# Patient Record
Sex: FEMALE | Race: BLACK OR AFRICAN AMERICAN | NOT HISPANIC OR LATINO | URBAN - METROPOLITAN AREA
[De-identification: names, ages, dates, MRNs, and addresses within clinical notes are randomized per-mention and may not be internally consistent; named-entity substitution may affect disease eponyms.]

---

## 2018-08-03 ENCOUNTER — EMERGENCY (EMERGENCY)
Facility: HOSPITAL | Age: 33
LOS: 1 days | Discharge: HOME | End: 2018-08-03
Admitting: EMERGENCY MEDICINE
Payer: MEDICAID

## 2018-08-03 VITALS
TEMPERATURE: 98 F | OXYGEN SATURATION: 97 % | DIASTOLIC BLOOD PRESSURE: 67 MMHG | SYSTOLIC BLOOD PRESSURE: 130 MMHG | RESPIRATION RATE: 16 BRPM | HEART RATE: 89 BPM

## 2018-08-03 DIAGNOSIS — B00.9 HERPESVIRAL INFECTION, UNSPECIFIED: ICD-10-CM

## 2018-08-03 PROCEDURE — 99282 EMERGENCY DEPT VISIT SF MDM: CPT

## 2018-08-03 RX ORDER — VALACYCLOVIR 500 MG/1
1 TABLET, FILM COATED ORAL
Qty: 20 | Refills: 0 | OUTPATIENT
Start: 2018-08-03 | End: 2018-08-12

## 2018-08-03 RX ORDER — ACETAMINOPHEN 500 MG
650 TABLET ORAL ONCE
Qty: 0 | Refills: 0 | Status: DISCONTINUED | OUTPATIENT
Start: 2018-08-03 | End: 2018-08-07

## 2018-08-03 NOTE — ED PROVIDER NOTE - MEDICAL DECISION MAKING DETAILS
patient was told by her PMD she was positive for herpes I and II on routine blood work, no outbreak now, patient asking for rx for valtrex if she has outbreak, she will follow up with pmd

## 2018-08-03 NOTE — ED PROVIDER NOTE - OBJECTIVE STATEMENT
32 yo female here with her children stating that she was told by her PMD that she was positive for herpes I and II on routine testing and she is asking for medications to treat this condition. She has no signs or symptoms of outbreaks - she denies rashes or pain to oral or genital area.

## 2018-08-03 NOTE — ED ADULT TRIAGE NOTE - CHIEF COMPLAINT QUOTE
patient walk in "I was diagnoses with Herpes 1 and 2 and my doctor didn't do anything for me. I think I need medicine." no fever, s/s of outbreak

## 2018-08-03 NOTE — ED PROVIDER NOTE - ENMT, MLM
Airway patent, Nasal mucosa clear. Mouth with normal mucosa. Throat has no vesicles, no oropharyngeal exudates and uvula is midline.  no oral or pharyngeal rashes or vesicles

## 2018-08-03 NOTE — ED ADULT NURSE NOTE - NSIMPLEMENTINTERV_GEN_ALL_ED
Implemented All Universal Safety Interventions:  [place] to call system. Call bell, personal items and telephone within reach. Instruct patient to call for assistance. Room bathroom lighting operational. Non-slip footwear when patient is off stretcher. Physically safe environment: no spills, clutter or unnecessary equipment. Stretcher in lowest position, wheels locked, appropriate side rails in place.

## 2021-07-14 ENCOUNTER — EMERGENCY (EMERGENCY)
Facility: HOSPITAL | Age: 36
LOS: 1 days | Discharge: HOME | End: 2021-07-14
Attending: EMERGENCY MEDICINE | Admitting: EMERGENCY MEDICINE
Payer: MEDICAID

## 2021-07-14 ENCOUNTER — EMERGENCY (EMERGENCY)
Facility: HOSPITAL | Age: 36
LOS: 1 days | Discharge: ANOTHER HOSPITAL | End: 2021-07-14
Attending: EMERGENCY MEDICINE | Admitting: EMERGENCY MEDICINE
Payer: MEDICAID

## 2021-07-14 VITALS
OXYGEN SATURATION: 97 % | RESPIRATION RATE: 16 BRPM | TEMPERATURE: 98 F | SYSTOLIC BLOOD PRESSURE: 142 MMHG | WEIGHT: 130.07 LBS | DIASTOLIC BLOOD PRESSURE: 78 MMHG | HEIGHT: 65 IN | HEART RATE: 76 BPM

## 2021-07-14 VITALS
HEART RATE: 79 BPM | DIASTOLIC BLOOD PRESSURE: 79 MMHG | SYSTOLIC BLOOD PRESSURE: 109 MMHG | RESPIRATION RATE: 16 BRPM | OXYGEN SATURATION: 98 %

## 2021-07-14 VITALS
SYSTOLIC BLOOD PRESSURE: 136 MMHG | RESPIRATION RATE: 16 BRPM | OXYGEN SATURATION: 97 % | WEIGHT: 149.91 LBS | TEMPERATURE: 99 F | DIASTOLIC BLOOD PRESSURE: 75 MMHG | HEART RATE: 90 BPM | HEIGHT: 65 IN

## 2021-07-14 DIAGNOSIS — O26.891 OTHER SPECIFIED PREGNANCY RELATED CONDITIONS, FIRST TRIMESTER: ICD-10-CM

## 2021-07-14 DIAGNOSIS — R10.2 PELVIC AND PERINEAL PAIN: ICD-10-CM

## 2021-07-14 DIAGNOSIS — Z3A.11 11 WEEKS GESTATION OF PREGNANCY: ICD-10-CM

## 2021-07-14 DIAGNOSIS — Z3A.08 8 WEEKS GESTATION OF PREGNANCY: ICD-10-CM

## 2021-07-14 DIAGNOSIS — Z20.822 CONTACT WITH AND (SUSPECTED) EXPOSURE TO COVID-19: ICD-10-CM

## 2021-07-14 LAB
ALBUMIN SERPL ELPH-MCNC: 3.3 G/DL — LOW (ref 3.4–5)
ALP SERPL-CCNC: 43 U/L — SIGNIFICANT CHANGE UP (ref 40–120)
ALT FLD-CCNC: 18 U/L — SIGNIFICANT CHANGE UP (ref 12–42)
ANION GAP SERPL CALC-SCNC: 8 MMOL/L — LOW (ref 9–16)
APPEARANCE UR: CLEAR — SIGNIFICANT CHANGE UP
AST SERPL-CCNC: 12 U/L — LOW (ref 15–37)
BASOPHILS # BLD AUTO: 0.01 K/UL — SIGNIFICANT CHANGE UP (ref 0–0.2)
BASOPHILS NFR BLD AUTO: 0.2 % — SIGNIFICANT CHANGE UP (ref 0–2)
BILIRUB SERPL-MCNC: 0.4 MG/DL — SIGNIFICANT CHANGE UP (ref 0.2–1.2)
BILIRUB UR-MCNC: NEGATIVE — SIGNIFICANT CHANGE UP
BUN SERPL-MCNC: 10 MG/DL — SIGNIFICANT CHANGE UP (ref 7–23)
CALCIUM SERPL-MCNC: 9 MG/DL — SIGNIFICANT CHANGE UP (ref 8.5–10.5)
CHLORIDE SERPL-SCNC: 104 MMOL/L — SIGNIFICANT CHANGE UP (ref 96–108)
CO2 SERPL-SCNC: 24 MMOL/L — SIGNIFICANT CHANGE UP (ref 22–31)
COLOR SPEC: YELLOW — SIGNIFICANT CHANGE UP
CREAT SERPL-MCNC: 0.66 MG/DL — SIGNIFICANT CHANGE UP (ref 0.5–1.3)
DIFF PNL FLD: NEGATIVE — SIGNIFICANT CHANGE UP
EOSINOPHIL # BLD AUTO: 0.04 K/UL — SIGNIFICANT CHANGE UP (ref 0–0.5)
EOSINOPHIL NFR BLD AUTO: 0.7 % — SIGNIFICANT CHANGE UP (ref 0–6)
GLUCOSE SERPL-MCNC: 122 MG/DL — HIGH (ref 70–99)
GLUCOSE UR QL: NEGATIVE — SIGNIFICANT CHANGE UP
HCG SERPL-ACNC: HIGH MIU/ML
HCT VFR BLD CALC: 31.6 % — LOW (ref 34.5–45)
HGB BLD-MCNC: 10.8 G/DL — LOW (ref 11.5–15.5)
IMM GRANULOCYTES NFR BLD AUTO: 0.3 % — SIGNIFICANT CHANGE UP (ref 0–1.5)
KETONES UR-MCNC: NEGATIVE — SIGNIFICANT CHANGE UP
LEUKOCYTE ESTERASE UR-ACNC: NEGATIVE — SIGNIFICANT CHANGE UP
LYMPHOCYTES # BLD AUTO: 1.96 K/UL — SIGNIFICANT CHANGE UP (ref 1–3.3)
LYMPHOCYTES # BLD AUTO: 33.8 % — SIGNIFICANT CHANGE UP (ref 13–44)
MCHC RBC-ENTMCNC: 29.3 PG — SIGNIFICANT CHANGE UP (ref 27–34)
MCHC RBC-ENTMCNC: 34.2 GM/DL — SIGNIFICANT CHANGE UP (ref 32–36)
MCV RBC AUTO: 85.9 FL — SIGNIFICANT CHANGE UP (ref 80–100)
MONOCYTES # BLD AUTO: 0.41 K/UL — SIGNIFICANT CHANGE UP (ref 0–0.9)
MONOCYTES NFR BLD AUTO: 7.1 % — SIGNIFICANT CHANGE UP (ref 2–14)
NEUTROPHILS # BLD AUTO: 3.36 K/UL — SIGNIFICANT CHANGE UP (ref 1.8–7.4)
NEUTROPHILS NFR BLD AUTO: 57.9 % — SIGNIFICANT CHANGE UP (ref 43–77)
NITRITE UR-MCNC: NEGATIVE — SIGNIFICANT CHANGE UP
NRBC # BLD: 0 /100 WBCS — SIGNIFICANT CHANGE UP (ref 0–0)
PH UR: 6 — SIGNIFICANT CHANGE UP (ref 5–8)
PLATELET # BLD AUTO: 288 K/UL — SIGNIFICANT CHANGE UP (ref 150–400)
POTASSIUM SERPL-MCNC: 3.5 MMOL/L — SIGNIFICANT CHANGE UP (ref 3.5–5.3)
POTASSIUM SERPL-SCNC: 3.5 MMOL/L — SIGNIFICANT CHANGE UP (ref 3.5–5.3)
PROT SERPL-MCNC: 7.4 G/DL — SIGNIFICANT CHANGE UP (ref 6.4–8.2)
PROT UR-MCNC: NEGATIVE MG/DL — SIGNIFICANT CHANGE UP
RBC # BLD: 3.68 M/UL — LOW (ref 3.8–5.2)
RBC # FLD: 11.8 % — SIGNIFICANT CHANGE UP (ref 10.3–14.5)
SODIUM SERPL-SCNC: 136 MMOL/L — SIGNIFICANT CHANGE UP (ref 132–145)
SP GR SPEC: >=1.03 — SIGNIFICANT CHANGE UP (ref 1–1.03)
UROBILINOGEN FLD QL: 0.2 E.U./DL — SIGNIFICANT CHANGE UP
WBC # BLD: 5.8 K/UL — SIGNIFICANT CHANGE UP (ref 3.8–10.5)
WBC # FLD AUTO: 5.8 K/UL — SIGNIFICANT CHANGE UP (ref 3.8–10.5)

## 2021-07-14 PROCEDURE — 99284 EMERGENCY DEPT VISIT MOD MDM: CPT

## 2021-07-14 PROCEDURE — 99285 EMERGENCY DEPT VISIT HI MDM: CPT

## 2021-07-14 NOTE — ED ADULT TRIAGE NOTE - CHIEF COMPLAINT QUOTE
/A0 approx 8 wks pregnant pt presents c/o sharp abdominal pains x2 days.  Pt endorses N/V, but denies vaginal bleeding.  Pt has not had US to confirm pregnancy placement.

## 2021-07-14 NOTE — ED ADULT TRIAGE NOTE - RESPIRATORY RATE (BREATHS/MIN)
Plan of care reviewed patient verbalized understanding. Medications administered. Blood glucose monitoring per sliding scale. Cardiac monitoring. Safety maintained bed in the lowest position,call bell within reach, bed alarm on.    16

## 2021-07-14 NOTE — ED ADULT NURSE NOTE - OBJECTIVE STATEMENT
35 yo F c/o pregnancy problem. Pt reports sudden onset abd pain x2 days. , reports gestation at about 8 weeks. LMP in May, denies vaginal bleeding. +N/V. Pt reports she did not have an ultrasound for this pregnancy to confirm IUP, and later pt stated she had a transvaginal ultrasound but could not recall if it was this pregnancy or the one prior. Pt speaking in full complete sentences, ambulatory with steady gait.

## 2021-07-14 NOTE — ED PROVIDER NOTE - OBJECTIVE STATEMENT
36 yof pw abd pain x 2 days, , LMP May.  NV but no vaginal bleeding.  no urinary sx.  IUP not confirmed. 36 yof pw abd pain x 2 days, , LMP May.  NV but no vaginal bleeding.  no urinary sx.  IUP not confirmed.  lower abd, nonradiating, constant.

## 2021-07-14 NOTE — ED PROVIDER NOTE - CLINICAL SUMMARY MEDICAL DECISION MAKING FREE TEXT BOX
, 2 days of abd pain, hemodynamically stable, nontoxic appearing, [facility] US not available, will need to be transferred to [facility] for US, lights siren transfer requested, also informed she will need type screen pending US at receiving facility

## 2021-07-15 VITALS
SYSTOLIC BLOOD PRESSURE: 138 MMHG | DIASTOLIC BLOOD PRESSURE: 79 MMHG | TEMPERATURE: 98 F | HEART RATE: 71 BPM | OXYGEN SATURATION: 98 % | RESPIRATION RATE: 16 BRPM

## 2021-07-15 LAB
BLD GP AB SCN SERPL QL: NEGATIVE — SIGNIFICANT CHANGE UP
RH IG SCN BLD-IMP: NEGATIVE — SIGNIFICANT CHANGE UP
RH IG SCN BLD-IMP: NEGATIVE — SIGNIFICANT CHANGE UP
SARS-COV-2 RNA SPEC QL NAA+PROBE: SIGNIFICANT CHANGE UP

## 2021-07-15 PROCEDURE — 76801 OB US < 14 WKS SINGLE FETUS: CPT | Mod: 26

## 2021-07-15 PROCEDURE — 76817 TRANSVAGINAL US OBSTETRIC: CPT | Mod: 26

## 2021-07-15 PROCEDURE — 86850 RBC ANTIBODY SCREEN: CPT

## 2021-07-15 PROCEDURE — 76817 TRANSVAGINAL US OBSTETRIC: CPT

## 2021-07-15 PROCEDURE — 86900 BLOOD TYPING SEROLOGIC ABO: CPT

## 2021-07-15 PROCEDURE — 76801 OB US < 14 WKS SINGLE FETUS: CPT

## 2021-07-15 PROCEDURE — 86901 BLOOD TYPING SEROLOGIC RH(D): CPT

## 2021-07-15 PROCEDURE — 99284 EMERGENCY DEPT VISIT MOD MDM: CPT | Mod: 25

## 2021-07-15 NOTE — CONSULT NOTE ADULT - SUBJECTIVE AND OBJECTIVE BOX
37yo  at 11+0 by today's 1st TM US (LMP unknown, "sometime in May"), [facility]. When questioning patient about her abdominal pain, she stated that she does not, and never did, have any abdominal pain - she follows with an OB at [facility], and has an upcoming US appointment , and did not want to wait that long to "see the baby." Pt had her first OB appointment last month, at which time she had blood work and a pap smear, which she states was normal. She has her next appointment . She denies abdominal pain, vaginal bleeding, urinary sxs. Reports intermittent nausea and vomiting that is consistent with her "usual morning sickness." This is a desired pregnancy.    OB Hx:  G1: VTOP D&C "a long time ago"  G2: , FT,   G3: c/s for arrest of dilation/descent? , FT, uncomplicated  G4: current    Last PAP smear: 2021, normal. Denies STIs   PMHx: denies  SHx: c/s, VTOP  Meds: denies  Allergies: NKDA      PHYSICAL EXAM:   Vital Signs Last 24 Hrs  T(C): 36.9 (2021 23:36), Max: 37.1 (2021 22:45)  T(F): 98.4 (2021 23:36), Max: 98.8 (2021 22:45)  HR: 76 (2021 23:36) (76 - 90)  BP: 142/78 (2021 23:36) (109/79 - 142/78)  BP(mean): --  RR: 16 (2021 23:36) (16 - 16)  SpO2: 97% (2021 23:36) (97% - 98%)    **************************  Constitutional: Alert & Oriented x3, No acute distress  Respiratory: Clear to ausculation bilaterally; no wheezing, rhonchi, or crackles  Cardiovascular: regular rate and rhythm, no murmurs, or gallops  Gastrointestinal: soft, non tender, positive bowel sounds, no rebound or guarding   Pelvic exam: refused   Extremities: no calf tenderness or swelling    LABS:                        10.8   5.80  )-----------( 288      ( 2021 23:07 )             31.6     -14    136  |  104  |  10  ----------------------------<  122<H>  3.5   |  24  |  0.66    Ca    9.0      2021 23:07    TPro  7.4  /  Alb  3.3<L>  /  TBili  0.4  /  DBili  x   /  AST  12<L>  /  ALT  18  /  AlkPhos  43  -      Urinalysis Basic - ( 2021 23:22 )    Color: Yellow / Appearance: Clear / SG: >=1.030 / pH: x  Gluc: x / Ketone: NEGATIVE  / Bili: NEGATIVE / Urobili: 0.2 E.U./dL   Blood: x / Protein: NEGATIVE mg/dL / Nitrite: NEGATIVE   Leuk Esterase: NEGATIVE / RBC: x / WBC x   Sq Epi: x / Non Sq Epi: x / Bacteria: x      HCG Quantitative, Serum: 02446 mIU/mL ( @ 23:07)      RADIOLOGY & ADDITIONAL STUDIES:    PROCEDURE DATE:  07/15/2021    ******PRELIMINARY REPORT******    ******PRELIMINARY REPORT******              INTERPRETATION:  OBSTETRICAL ULTRASOUND - FIRST TRIMESTER dated 7/15/2021 1:51 AM    INDICATION: First trimester pregnancy with abdominal pain. LMP: May 2021. Beta-hC    TECHNIQUE: Transabdominal views of the pelvis were obtained followed by transvaginal views for better visualization of the endometrial cavity.    PRIOR STUDIES: CT abdomen and pelvis from 5/10/2016    FINDINGS:  Limited transvaginal examination due to overlying gas.    By dates, the estimated gestational age is unknown.  A single intrauterine gestation is visible with an average ultrasound age of 11 weeks 0 days.  Mean sac diameter is 5.1 cm, corresponding to a gestational age of 11 weeks 1 day.  Crown-rump length is 3.7 cm, corresponding to gestational age of 10 weeks 5 days.  Fetal cardiac motion is visible with fetal heart rate of 170 beats per minute.  A yolk sac is visible with internal diameter 0.5 cm, which is normal.    A normal amount of amniotic fluid is evident. The placenta is located anterior. No placenta previa is evident. No subchorionic bleed is visible. The cervix is closed with a length of 3.9 cm.    The uterus is anteverted. The uterus is 11 x 6 x 9 cm. No myometrial abnormalities are seen.    The ovaries are only seen transabdominally. The right ovary is normal in size, measuring 2.4 x 1.1 x 2.2 cm with a volume of 3 cc. No right ovarian masses are seen. The left ovary is normal in size, measuring 2.4 x 2.0 x 1.9 cm with a volume of 5 cc. No left ovarian masses are seen.    Doppler evaluation demonstrates arterial and venous flow to both ovaries with no evidence of torsion.      IMPRESSION:    Single live intrauterine gestation with an average ultrasound age of 11 weeks 0 days and fetal heart rate of 170 beats minute.

## 2021-07-15 NOTE — ED ADULT NURSE NOTE - OBJECTIVE STATEMENT
Pt arrives transfer from [facility]. Pt reports generalized abd pain, N/V. Pt states she is pregnant, LMP was in early May, unknown EGA. Pt denies any vaginal bleeding/discharge, no diarrhea, no fever/chills, no urinary symptoms. pt AOx4, ambulatory with steady gait.

## 2021-07-15 NOTE — ED PROVIDER NOTE - NSFOLLOWUPINSTRUCTIONS_ED_ALL_ED_FT
First Trimester Pregnancy  AMBULATORY CARE:  The first trimester of pregnancy lasts from your last period through the 12th week of pregnancy. Follow up with your healthcare providers for prenatal care. Regular prenatal care can help to keep you and your baby healthy.  Seek care immediately if:   •You have pain or cramping in your abdomen or low back.  •You have severe vaginal bleeding or clotting.  Call your doctor or obstetrician if:   •You have light bleeding.  •You have chills or a fever.  •You have vaginal itching, burning, or pain.  •You have yellow, green, white, or foul-smelling vaginal discharge.  •You have pain or burning when you urinate, less urine than usual, or pink or bloody urine.  •You have questions or concerns about your condition or care.  Prenatal care: Prenatal care is a series of visits with your healthcare provider throughout your pregnancy. Prenatal care can help prevent problems during pregnancy and childbirth. At each prenatal visit, your healthcare provider will weigh you and check your blood pressure. Your healthcare provider will also check your baby's heartbeat and growth. You may also need the following at some visits:  •A pelvic exam allows your healthcare provider to see your cervix (the bottom part of your uterus). Your healthcare provider will use a speculum to open your vagina. He or she will check the size and shape of your uterus.  •Blood tests may be done to check for any of the following:?Gestational diabetes or anemia (low iron level)  ?Blood type or Rh factor, or certain birth defects  ?Immunity to certain diseases, such as chickenpox or rubella  ?An infection, such as a sexually transmitted infection, HIV, or hepatitis B  •Hepatitis B may need to be prevented or treated. Hepatitis B is inflammation of the liver caused by the hepatitis B virus (HBV). HBV can spread from a mother to her baby during delivery. You will be checked for HBV as early as possible in the first trimester of each pregnancy. You need the test even if you received the hepatitis B vaccine or were tested before. You may need to have an HBV infection treated before you give birth.  •Urine tests may also be done to check for sugar and protein. These can be signs of gestational diabetes or preeclampsia. Urine tests may also be done to check for signs of infection.  •A fetal ultrasound shows pictures of your baby inside your uterus. The pictures are used to check your baby's development, movement, and position. Your healthcare provider may be able to tell you if you are having a boy or a girl during the ultrasound. This is usually possible at around 18 to 22 weeks.  Pregnancy Ultrasound  •Genetic disorder screening tests may be offered to you. These screening tests check your baby's risk for genetic disorders such as Down syndrome. A screening test includes a blood test and ultrasound. Blood tests may be used to check your DNA or your partner's DNA. Genetic tests are not always accurate or complete. Your baby may be born with a genetic disorder that did not show up in the tests. Talk to your healthcare provider about any concerns you have with genetic testing.  Stay healthy during pregnancy:   Tips for a Healthy Pregnancy  •Take prenatal vitamins as directed. Prenatal vitamins can help you get the amount of vitamins and minerals you need during pregnancy. Prenatal vitamins may also decrease the risk of certain birth defects.  Sources of Folic Acid  •Eat a variety of healthy foods. Healthy foods include fruits, vegetables, whole-grain breads, low-fat dairy foods, beans, lean meats, and fish. Drink liquids as directed. Ask how much liquid to drink each day and which liquids are best for you. Limit caffeine to less than 200 milligrams each day. Limit your intake of fish to 2 servings each week. Choose fish low in mercury such as canned light tuna, shrimp, crab, salmon, cod, or tilapia. Do not eat fish high in mercury such as swordfish, tilefish, trupti mackerel, and shark.  Healthy Foods  •Drink liquids as directed. Ask how much liquid to drink each day and which liquids are best for you. Some healthy liquids include milk, water, and juice. It is not clear how caffeine affects pregnancy. Limit your intake of caffeine to less than 200 mg each day to avoid possible health problems. Caffeine may be found in coffee, tea, cola, sports drinks, and chocolate. Do not drink alcohol.   •Talk to your healthcare provider before you take medicines. Many medicines can harm your baby, especially during early pregnancy. Ask your healthcare provider before you take any medicines, including over-the-counter medicines, vitamins, herbs, or food supplements. Never use illegal or street drugs while you are pregnant. Talk to your healthcare provider if you are having trouble quitting street drugs.  •Exercise as directed. Ask your healthcare provider about the best exercise plan for you. Exercise may help you feel better and make your labor and delivery easier.  Walking During Pregnancy  •Do not smoke. Smoking increases your risk of a miscarriage and other health problems during your pregnancy. Smoking can cause your baby to be born too early or weigh less at birth. Quit smoking as soon as you think you might be pregnant. Ask your healthcare provider for information if you need help quitting.  Safety tips:   •Do not use a hot tub or sauna while you are pregnant, especially during your first trimester. Hot tubs and saunas may raise your baby's temperature and increase the risk of birth defects. It also increases your risk of miscarriage.  •Protect yourself from illness. Toxoplasmosis is a disease that can cause birth defects. To protect yourself from this disease, do not clean your cat's litter box yourself. Ask someone else to clean your cat's litter box while you are pregnant. Also, do not eat raw meat or unwashed fruits and vegetables. Wash your hands after you touch raw meat, and eat only well-cooked meat. Wash fruits and vegetables well before you eat them.  Handwashing  Follow up with your doctor or obstetrician as directed: Go to all of your prenatal visits during your pregnancy. Write down your questions so you remember to ask them during your visits.

## 2021-07-15 NOTE — ED PROVIDER NOTE - PATIENT PORTAL LINK FT
You can access the FollowMyHealth Patient Portal offered by [facility] by registering at the following website: [email or web address]. By joining [employer]s FollowMyHealth portal, you will also be able to view your health information using other applications (apps) compatible with our system.

## 2021-07-15 NOTE — ED PROVIDER NOTE - OBJECTIVE STATEMENT
The pt is a 37 y/o F, BIBA - transfer from [facility], for abd pain - , gravid ~ 8 wks. Pt c/o diffuse abd pain x few d. Denies fevers, chills, cp, sob, n/v/d, vag bleeding The pt is a 35 y/o F, BIBA - transfer from [facility], for abd pain - , gravid ~ 8 wks. Pt c/o diffuse abd pain x few d. Denies fevers, chills, cp, sob, n/v/d, vag bleeding

## 2021-07-15 NOTE — ED PROVIDER NOTE - CLINICAL SUMMARY MEDICAL DECISION MAKING FREE TEXT BOX
2000-alert and oriented to person and place, confused about situation and time. Lungs clear, but diminished. BS active x 4 quads. Patient on bedrest d/t weak extremities. Purewick in place draining clear, yellow urine without difficulty. Patient has no c/o pain. On tele box 31 showing SR-ST with BBB. Shift summary-Patient remains on bedrest. Purewick in place, voiding without difficulty. Patient denied pain through the noc. pt transferred from [facility] for us and gyn eval , , preg ~8wks, c/o abd pain - benign abd on exam, labs from [facility] wnl, rh- but no bleeding, us @ 11 wks w/+FH, seen by gyn and cleared for dc, f/u w/clinic, pt understands and agrees w/plan, strict return precautions given

## 2021-07-15 NOTE — CONSULT NOTE ADULT - ASSESSMENT
37yo  at 11+0 by today's 1st TM US (LMP unknown, "sometime in May"), transferred from [facility] for abdominal pain, now admitting she just wanted an earlier US than the one she has scheduled outpt.  -VSS, pt should monitor her mild range BPs, possible chronic hypertension  -CBC/CMP WNL  -TVUS shows IUP at 11+0 with FH of 170  -Pt with no complaints, should follow up outpatient with her primary OB as scheduled  -No GYN intervention at this time

## 2021-07-15 NOTE — ED ADULT NURSE NOTE - NSIMPLEMENTINTERV_GEN_ALL_ED
Implemented All Universal Safety Interventions:  [place] to call system. Call bell, personal items and telephone within reach. Instruct patient to call for assistance. Room bathroom lighting operational. Non-slip footwear when patient is off stretcher. Physically safe environment: no spills, clutter or unnecessary equipment. Stretcher in lowest position, wheels locked, appropriate side rails in place.

## 2021-07-16 LAB
CULTURE RESULTS: SIGNIFICANT CHANGE UP
SPECIMEN SOURCE: SIGNIFICANT CHANGE UP

## 2023-07-12 NOTE — ED PROVIDER NOTE - NSTIMEPROVIDERCAREINITIATE_GEN_ER
Subjective:      Patient ID:  Lorna Vergara is a 28 y.o. female who presents for   Chief Complaint   Patient presents with    Establish Care     Patient in to establish care with a complaint of having a raised bump on her back, patient states she used to have a lot of keloids in her younger days, two bumps on her left arm by elbow, something that looks like a bruise or rash on her right thigh.      History of Present Illness: The patient presents with chief complaint of lesion.  Location: left upper back  Duration: 2-3 months  Signs/Symptoms: growth, opened    Prior treatments: none        Review of Systems   Constitutional:  Negative for fever and chills.   Gastrointestinal:  Negative for nausea and vomiting.   Skin:  Positive for activity-related sunscreen use. Negative for daily sunscreen use and recent sunburn.   Hematologic/Lymphatic: Does not bruise/bleed easily.     Objective:   Physical Exam   Constitutional: She appears well-developed and well-nourished. No distress.   Neurological: She is alert and oriented to person, place, and time. She is not disoriented.   Psychiatric: She has a normal mood and affect.   Skin:   Areas Examined (abnormalities noted in diagram):   Head / Face Inspection Performed  Neck Inspection Performed  Chest / Axilla Inspection Performed  Abdomen Inspection Performed  Back Inspection Performed  RUE Inspected  LUE Inspection Performed  RLE Inspected  LLE Inspection Performed  Nails and Digits Inspection Performed          Diagram Legend     Erythematous scaling macule/papule c/w actinic keratosis       Vascular papule c/w angioma      Pigmented verrucoid papule/plaque c/w seborrheic keratosis      Yellow umbilicated papule c/w sebaceous hyperplasia      Irregularly shaped tan macule c/w lentigo     1-2 mm smooth white papules consistent with Milia      Movable subcutaneous cyst with punctum c/w epidermal inclusion cyst      Subcutaneous movable cyst c/w pilar cyst      Firm pink to  brown papule c/w dermatofibroma      Pedunculated fleshy papule(s) c/w skin tag(s)      Evenly pigmented macule c/w junctional nevus     Mildly variegated pigmented, slightly irregular-bordered macule c/w mildly atypical nevus      Flesh colored to evenly pigmented papule c/w intradermal nevus       Pink pearly papule/plaque c/w basal cell carcinoma      Erythematous hyperkeratotic cursted plaque c/w SCC      Surgical scar with no sign of skin cancer recurrence      Open and closed comedones      Inflammatory papules and pustules      Verrucoid papule consistent consistent with wart     Erythematous eczematous patches and plaques     Dystrophic onycholytic nail with subungual debris c/w onychomycosis     Umbilicated papule    Erythematous-base heme-crusted tan verrucoid plaque consistent with inflamed seborrheic keratosis     Erythematous Silvery Scaling Plaque c/w Psoriasis     See annotation              Assessment / Plan:      Pathology Orders:       Normal Orders This Visit    Specimen to Pathology, Dermatology     Comments:    Number of Specimens:->1  ------------------------->-------------------------  Spec 1 Procedure:->Biopsy  Spec 1 Clinical Impression:->r/o BCC  Spec 1 Source:->left upper back  Release to patient->Immediate    Questions:    Procedure Type: Dermatology and skin neoplasms    Number of Specimens: 1    ------------------------: -------------------------    Spec 1 Procedure: Biopsy    Spec 1 Clinical Impression: r/o BCC    Spec 1 Source: left upper back    Clinical Information: see above    Release to patient: Immediate          Tinea versicolor  -     ketoconazole (NIZORAL) 2 % shampoo; Use as body wash, let sit 5-10 minutes prior to rinsing  Dispense: 120 mL; Refill: 11  -     ketoconazole (NIZORAL) 2 % cream; AAA bid. Non-steroid. Safe to use long term for maintenance.  Dispense: 60 g; Refill: 3  -     discussed dx, will start above meds, consider biopsy if fails to improve.    Maria Ines Nevarez  arm. Reassurance given.  Lesions are benign.    Neoplasm of uncertain behavior of skin  Screening, malignant neoplasm, skin  -     Specimen to Pathology, Dermatology  -     **Pt refused biopsy of the left lower back for potential irregular/atypical nevus.  Pt opts for surveillance against medical advice.  Discussed risk of irregular mole vs. Melanoma and risk for spread. The patient acknowledged understanding.   -     Shave biopsy(-ies) done of 1 site(s) of left upper back.   Patient informed to call for results within 2 weeks if have not [facility]           Follow up for call for results.    PROCEDURE NOTE - SHAVE BIOPSY   Location: see above    After risk, benefits, and alternatives were discussed with the patient, the patient agrees to the procedure by verbal informed consent.  The area(s) were cleansed with alcohol. 2 cc of lidocaine 1% with epinephrine was injected for local anesthesia into each lesion(s).  A sharp dermablade was used to remove part or all of the lesion(s).  The specimen(s) will be sent for tissue pathology.  Hemostasis was obtained with aluminum chloride and/or hyfrecation.  The area(s) were dressed with vaseline ointment and bandaged.  The patient tolerated the procedure well without adverse events.  Wound care instructions were given to the patient on the AVS.  The patient will be notified of pathology results once available. Results will also be available in Epic.     14-Jul-2021 23:57

## 2024-06-14 NOTE — ED ADULT NURSE NOTE - ALCOHOL PRE SCREEN (AUDIT - C)
Improved on Zepbound 0.5mg SC weekly x more 2 weeks, the increase to 1mg SC weekly.  Recommend lifestyle modifications.     Statement Selected

## 2024-11-13 NOTE — ED PROVIDER NOTE - MUSCULOSKELETAL, MLM
137 ml/hr 68hr  274 ml/hr 274 ml   Spine appears normal, range of motion is not limited, no muscle or joint tenderness